# Patient Record
Sex: FEMALE
[De-identification: names, ages, dates, MRNs, and addresses within clinical notes are randomized per-mention and may not be internally consistent; named-entity substitution may affect disease eponyms.]

---

## 2021-04-21 ENCOUNTER — APPOINTMENT (OUTPATIENT)
Dept: DISASTER EMERGENCY | Facility: OTHER | Age: 41
End: 2021-04-21
Payer: COMMERCIAL

## 2021-04-21 PROCEDURE — 0001A: CPT

## 2021-05-12 ENCOUNTER — APPOINTMENT (OUTPATIENT)
Dept: DISASTER EMERGENCY | Facility: OTHER | Age: 41
End: 2021-05-12
Payer: COMMERCIAL

## 2021-05-12 PROCEDURE — 0002A: CPT

## 2024-08-26 ENCOUNTER — TRANSCRIPTION ENCOUNTER (OUTPATIENT)
Age: 44
End: 2024-08-26

## 2024-08-26 ENCOUNTER — OUTPATIENT (OUTPATIENT)
Dept: OUTPATIENT SERVICES | Facility: HOSPITAL | Age: 44
LOS: 1 days | End: 2024-08-26
Payer: COMMERCIAL

## 2024-08-26 PROCEDURE — 71046 X-RAY EXAM CHEST 2 VIEWS: CPT

## 2024-08-26 PROCEDURE — 71046 X-RAY EXAM CHEST 2 VIEWS: CPT | Mod: 26

## 2024-09-04 ENCOUNTER — TRANSCRIPTION ENCOUNTER (OUTPATIENT)
Age: 44
End: 2024-09-04

## 2024-09-04 NOTE — ASU PATIENT PROFILE, ADULT - NSICDXPASTSURGICALHX_GEN_ALL_CORE_FT
PAST SURGICAL HISTORY:  Elective surgery egg retrival twice    History of ear surgery multiple as child    History of tonsillectomy and adenoidectomy     S/P LASIK surgery of both eyes

## 2024-09-04 NOTE — ASU PATIENT PROFILE, ADULT - FALL HARM RISK - UNIVERSAL INTERVENTIONS
Bed in lowest position, wheels locked, appropriate side rails in place/Call bell, personal items and telephone in reach/Instruct patient to call for assistance before getting out of bed or chair/Non-slip footwear when patient is out of bed/[place] to call system/Physically safe environment - no spills, clutter or unnecessary equipment/Purposeful Proactive Rounding/Room/bathroom lighting operational, light cord in reach

## 2024-09-04 NOTE — ASU PATIENT PROFILE, ADULT - NSICDXPASTMEDICALHX_GEN_ALL_CORE_FT
PAST MEDICAL HISTORY:  ADD (attention deficit disorder) without hyperactivity     Celiac disease

## 2024-09-04 NOTE — ASU PATIENT PROFILE, ADULT - ABLE TO REACH PT
Voice message : time  16;53 pm  phone  [phone]- arrival time is at  8:00 am  , procedure is at 10:00 am , need to be NPO/NO solid foods after  2200 pm tonight, allow to drink water or apple juice till  12Mn, dress comfortable,  no lotions, no jewelry, no nail polish,  Bring ID photo and insurance cards,  escort arranged with a person 20 years old,  address and telephone given  to return call, need to take medication  for blood pressure  , thyroid   asthma before arriving to the hospital , No medicine  for Diabetes , if takes anticoagulant , please call your MD  for advise  ,/no

## 2024-09-05 ENCOUNTER — TRANSCRIPTION ENCOUNTER (OUTPATIENT)
Age: 44
End: 2024-09-05

## 2024-09-05 ENCOUNTER — OUTPATIENT (OUTPATIENT)
Dept: OUTPATIENT SERVICES | Facility: HOSPITAL | Age: 44
LOS: 1 days | Discharge: HOME | End: 2024-09-05
Payer: COMMERCIAL

## 2024-09-05 VITALS
DIASTOLIC BLOOD PRESSURE: 60 MMHG | RESPIRATION RATE: 16 BRPM | HEIGHT: 70 IN | TEMPERATURE: 97 F | HEART RATE: 45 BPM | WEIGHT: 136.69 LBS | SYSTOLIC BLOOD PRESSURE: 110 MMHG | OXYGEN SATURATION: 98 %

## 2024-09-05 VITALS
WEIGHT: 136.69 LBS | HEIGHT: 70 IN | DIASTOLIC BLOOD PRESSURE: 61 MMHG | HEART RATE: 61 BPM | RESPIRATION RATE: 15 BRPM | OXYGEN SATURATION: 100 % | TEMPERATURE: 97 F | SYSTOLIC BLOOD PRESSURE: 118 MMHG

## 2024-09-05 DIAGNOSIS — Z98.890 OTHER SPECIFIED POSTPROCEDURAL STATES: Chronic | ICD-10-CM

## 2024-09-05 DIAGNOSIS — Z41.9 ENCOUNTER FOR PROCEDURE FOR PURPOSES OTHER THAN REMEDYING HEALTH STATE, UNSPECIFIED: Chronic | ICD-10-CM

## 2024-09-05 PROBLEM — Z00.00 ENCOUNTER FOR PREVENTIVE HEALTH EXAMINATION: Status: ACTIVE | Noted: 2024-09-05

## 2024-09-05 PROCEDURE — 88304 TISSUE EXAM BY PATHOLOGIST: CPT | Mod: 26

## 2024-09-05 DEVICE — SYS CATH BALLOON ACCLARENT EUSTACHIAN 6X16MM: Type: IMPLANTABLE DEVICE | Status: FUNCTIONAL

## 2024-09-05 DEVICE — SURGIFOAM PAD 2CM X 6CM X 7MM (12-7): Type: IMPLANTABLE DEVICE | Status: FUNCTIONAL

## 2024-09-05 DEVICE — SILICON MOD.T TUB: Type: IMPLANTABLE DEVICE | Status: FUNCTIONAL

## 2024-09-05 RX ORDER — DEXTROAMPHETAMINE SACCHARATE, AMPHETAMINE ASPARTATE MONOHYDRATE, DEXTROAMPHETAMINE SULFATE, AMPHETAMINE SULFATE 6.25; 6.25; 6.25; 6.25 MG/1; MG/1; MG/1; MG/1
1 CAPSULE, EXTENDED RELEASE ORAL
Refills: 0 | DISCHARGE

## 2024-09-05 RX ORDER — METHYLPREDNISOLONE 4 MG
0 TABLET ORAL
Refills: 0 | DISCHARGE

## 2024-09-05 RX ORDER — APREPITANT 40 MG/1
40 CAPSULE ORAL ONCE
Refills: 0 | Status: COMPLETED | OUTPATIENT
Start: 2024-09-05 | End: 2024-09-05

## 2024-09-05 RX ORDER — AZITHROMYCIN 500 MG/1
0 TABLET, FILM COATED ORAL
Refills: 0 | DISCHARGE

## 2024-09-05 RX ORDER — FENTANYL CITRATE 50 UG/ML
25 INJECTION INTRAMUSCULAR; INTRAVENOUS
Refills: 0 | Status: DISCONTINUED | OUTPATIENT
Start: 2024-09-05 | End: 2024-09-05

## 2024-09-05 RX ORDER — ACETAMINOPHEN 325 MG/1
1000 TABLET ORAL ONCE
Refills: 0 | Status: COMPLETED | OUTPATIENT
Start: 2024-09-05 | End: 2024-09-05

## 2024-09-05 RX ADMIN — APREPITANT 40 MILLIGRAM(S): 40 CAPSULE ORAL at 09:22

## 2024-09-05 RX ADMIN — ACETAMINOPHEN 1000 MILLIGRAM(S): 325 TABLET ORAL at 09:22

## 2024-09-05 RX ADMIN — ACETAMINOPHEN 1000 MILLIGRAM(S): 325 TABLET ORAL at 17:15

## 2024-09-05 RX ADMIN — Medication 100 MILLILITER(S): at 13:32

## 2024-09-05 RX ADMIN — FENTANYL CITRATE 25 MICROGRAM(S): 50 INJECTION INTRAMUSCULAR; INTRAVENOUS at 14:40

## 2024-09-05 RX ADMIN — ACETAMINOPHEN 1000 MILLIGRAM(S): 325 TABLET ORAL at 16:45

## 2024-09-05 RX ADMIN — FENTANYL CITRATE 25 MICROGRAM(S): 50 INJECTION INTRAMUSCULAR; INTRAVENOUS at 14:55

## 2024-09-05 RX ADMIN — Medication 1000 MILLILITER(S): at 14:53

## 2024-09-05 NOTE — PRE-ANESTHESIA EVALUATION ADULT - NSANTHOSAYNRD_GEN_A_CORE
No. WES screening performed.  STOP BANG Legend: 0-2 = LOW Risk; 3-4 = INTERMEDIATE Risk; 5-8 = HIGH Risk
No. WES screening performed.  STOP BANG Legend: 0-2 = LOW Risk; 3-4 = INTERMEDIATE Risk; 5-8 = HIGH Risk

## 2024-09-05 NOTE — ASU DISCHARGE PLAN (ADULT/PEDIATRIC) - ASU DC SPECIAL INSTRUCTIONSFT
- Please leave mastoid dressing in place for next 24 hours  - Please follow up with Dr. Kerr as scheduled  - No heavy lifting, bearing down, or strenuous activity for next 3 weeks  - Please take over the counter pain medication as needed - Please leave [place] dressing on ear until follow up with Dr. Kerr  - Please follow up with Dr. Kerr as scheduled  - No heavy lifting, bearing down, or strenuous activity for next 3 weeks  - Please take over the counter pain medication as needed

## 2024-09-05 NOTE — ASU DISCHARGE PLAN (ADULT/PEDIATRIC) - CARE PROVIDER_API CALL
Carlos Kerr  Otolaryngology  [address], [address]  [place] [address]  Phone: [phone]  Fax: [phone]  Established Patient  Follow Up Time:

## 2024-09-05 NOTE — BRIEF OPERATIVE NOTE - OPERATION/FINDINGS
See full operative note; *** See full operative note; left tympanoplasty and atticotomy with cartilage and fascial grafts; left T-tube; right intratympanic steroid injection; bilateral Eustachian tube dilation; bilateral inferior turbinate reduction

## 2024-09-05 NOTE — BRIEF OPERATIVE NOTE - NSICDXBRIEFPROCEDURE_GEN_ALL_CORE_FT
PROCEDURES:  Tympanomastoidectomy 05-Sep-2024 10:19:54  Xiomara Cutler  Middle ear exploration 05-Sep-2024 10:20:03  Xiomara Cutler  Endoscopic dilation of left eustachian tube 05-Sep-2024 10:20:16  Xiomara Cutler   PROCEDURES:  Middle ear exploration 05-Sep-2024 10:20:03  Xiomara Cutler  Endoscopic dilation of left eustachian tube 05-Sep-2024 10:20:16  Xiomara Cutler  Left tympanoplasty 05-Sep-2024 13:22:43  Xiomara Cutler  Atticotomy of left ear 05-Sep-2024 13:22:50  Xiomara Cutler  Endoscopic dilation of right eustachian tube 05-Sep-2024 13:23:03  Xiomara Cutler  Myringotomy with insertion of tube 05-Sep-2024 13:23:28  Xiomara Cutler  Injection into tympanic cavity 05-Sep-2024 13:23:42  Xiomara Cutler

## 2024-09-05 NOTE — BRIEF OPERATIVE NOTE - NSICDXBRIEFPREOP_GEN_ALL_CORE_FT
PRE-OP DIAGNOSIS:  Eustachian tube dysfunction, left 05-Sep-2024 10:20:29  Xiomara Cutler   PRE-OP DIAGNOSIS:  Eustachian tube dysfunction, left 05-Sep-2024 10:20:29  Xiomara Cutler  Eustachian tube dysfunction, right 05-Sep-2024 13:24:07  Xiomara Cutler

## 2024-09-05 NOTE — ASU DISCHARGE PLAN (ADULT/PEDIATRIC) - PROCEDURE
Left tympanomastoidectomy; left middle ear exploration; left Eustachian tube dilation; exam of right ear under anesthesia Left tympanoplasty and atticotomy; left middle ear exploration; left myringotomy with tube insertion; bilateral Eustachian tube dilation; exam of right ear under anesthesia with intratympanic steroid injection; bilateral inferior turbinate outfracture

## 2024-09-09 LAB — SURGICAL PATHOLOGY STUDY: SIGNIFICANT CHANGE UP

## (undated) DEVICE — DRSG KLING 4"

## (undated) DEVICE — SUT CHROMIC 3-0 27" SH

## (undated) DEVICE — DRSG TEGADERM 2.5X3"

## (undated) DEVICE — BEAVER BLADE MIN-BLADE ROUNDED TIP 1-SIDE SHARP (GREEN)

## (undated) DEVICE — DRSG STERISTRIPS 0.5 X 4"

## (undated) DEVICE — TUBING SUCTION NONCONDUCTIVE 6MM X 12FT

## (undated) DEVICE — BUR STRYKER FLUTED ROUND SOFT TOUCH 3MM

## (undated) DEVICE — PREP BETADINE KIT

## (undated) DEVICE — PREP ALCOHOL PAD MED

## (undated) DEVICE — PACK TYMPANOMASTOIDECTOMY LF

## (undated) DEVICE — GOWN SLEEVES

## (undated) DEVICE — NDL NERVE STIM 22GA X 2IN 30 DEG

## (undated) DEVICE — KNIFE ALCON MVR V-LANCE 20G (WHITE)

## (undated) DEVICE — DRAPE STERI-DRAPE MEDIUM W APERTURE

## (undated) DEVICE — STRYKER CORE IRRIGATION DRILL CASSETTE DISP

## (undated) DEVICE — DRSG MASTISOL

## (undated) DEVICE — ELCTR BOVIE TIP BLADE INSULATED 2.75" EDGE

## (undated) DEVICE — DRAPE MICROSCOPE LEICA 54" X 150"

## (undated) DEVICE — GLV 7.5 PROTEXIS (WHITE)

## (undated) DEVICE — PACKING 1/2"

## (undated) DEVICE — DRSG GLASSOCK ADULT

## (undated) DEVICE — ELCTR NDL SUBDERMAL 2 CHANNEL

## (undated) DEVICE — BUR STRYKER DIAMOND ROUND COARSE 3MM

## (undated) DEVICE — MARKING PEN W RULER

## (undated) DEVICE — DRAPE APERTURE

## (undated) DEVICE — SOL ANTI FOG

## (undated) DEVICE — BUR STRYKER DIAMOND ROUND COARSE 2MM

## (undated) DEVICE — DRSG PACKING NASAL DEROYAL COTTON STRIP

## (undated) DEVICE — DRAIN PENROSE .25" X 18" LATEX

## (undated) DEVICE — PACK RHINOPLASTY

## (undated) DEVICE — VENODYNE/SCD SLEEVE CALF MEDIUM

## (undated) DEVICE — SOL IRR BAG NS 0.9% 3000ML

## (undated) DEVICE — SUT MONOCRYL 5-0 18" P-3 UNDYED

## (undated) DEVICE — ACCLARENT SET INFLATION DEVICE

## (undated) DEVICE — WARMING BLANKET LOWER ADULT

## (undated) DEVICE — BUR STRYKER FLUTED ROUND SOFT TOUCH 5MM

## (undated) DEVICE — DRAPE IRRIGATION POUCH 19X23"

## (undated) DEVICE — DRAPE MAYO STAND 23"

## (undated) DEVICE — DRSG GAUZE FLUFF 1PLY 18X30"

## (undated) DEVICE — PACK NASAL SINUS KEN 3.5 X 1.2 X 1.2CM